# Patient Record
Sex: FEMALE | Race: OTHER
[De-identification: names, ages, dates, MRNs, and addresses within clinical notes are randomized per-mention and may not be internally consistent; named-entity substitution may affect disease eponyms.]

---

## 2018-06-25 ENCOUNTER — HOSPITAL ENCOUNTER (EMERGENCY)
Dept: HOSPITAL 31 - C.ER | Age: 28
Discharge: HOME | End: 2018-06-25
Payer: MEDICAID

## 2018-06-25 VITALS
DIASTOLIC BLOOD PRESSURE: 64 MMHG | TEMPERATURE: 98 F | RESPIRATION RATE: 16 BRPM | SYSTOLIC BLOOD PRESSURE: 99 MMHG | HEART RATE: 92 BPM | OXYGEN SATURATION: 100 %

## 2018-06-25 DIAGNOSIS — M72.2: Primary | ICD-10-CM

## 2018-06-25 NOTE — C.PDOC
History Of Present Illness


29 y/o female presents to ED with c/o right heel pain for 1 week. Patient 

reports taking Ibuprofen with no relief and denies trauma, leg pain, calf pain, 

numbness or any other complaints at this time. 


Time Seen by Provider: 06/25/18 19:51


Chief Complaint (Nursing): Lower Extremity Problem/Injury


History Per: Patient


History/Exam Limitations: no limitations


Onset/Duration Of Symptoms: Days


Current Symptoms Are (Timing): Still Present





Past Medical History


Reviewed: Historical Data, Nursing Documentation, Vital Signs


Vital Signs: 


 Last Vital Signs











Temp  98 F   06/25/18 19:41


 


Pulse  92 H  06/25/18 19:41


 


Resp  16   06/25/18 19:41


 


BP  99/64 L  06/25/18 19:41


 


Pulse Ox  100   06/25/18 21:10














- Medical History


PMH: No Chronic Diseases


Surgical History: No Surg Hx





- [facility] Procedures








ESOPHAGOGASTRODUODENOSCOPY [EGD] W/CLOSED BIOPSY (08/20/15)








Family History: States: No Known Family Hx





- Social History


Hx Alcohol Use: No


Hx Substance Use: No





- Immunization History


Hx Tetanus Toxoid Vaccination: No


Hx Influenza Vaccination: Yes


Hx Pneumococcal Vaccination: No





Review Of Systems


Constitutional: Negative for: Fever, Chills


Cardiovascular: Negative for: Chest Pain


Musculoskeletal: Positive for: Foot Pain


Skin: Negative for: Rash


Neurological: Negative for: Weakness, Numbness





Physical Exam





- Physical Exam


Appears: Non-toxic, No Acute Distress


Skin: Warm, Dry, No Rash


Head: Atraumatic, Normacephalic


Eye(s): bilateral: Normal Inspection


Oral Mucosa: Moist


Extremity: Tenderness (to right heel), No Calf Tenderness, Capillary Refill (<2 

seconds), No Swelling, Other (No erythema.)


Extremity: Bilateral: Normal ROM


Neurological/Psych: Oriented x3, Normal Motor, Normal Sensation


Gait: Steady





ED Course And Treatment


O2 Sat by Pulse Oximetry: 100 (RA)


Pulse Ox Interpretation: Normal


Progress Note: Pt advised Podiatry follow up





Disposition


Counseled Patient/Family Regarding: Diagnosis, Need For Followup, Rx Given





- Disposition


Referrals: 


John Casey MD [Medical Doctor] - 


Disposition: HOME/ ROUTINE


Disposition Time: 20:10


Condition: STABLE


Additional Instructions: 


Please follow up with Podiatry





Wear supportive shoes





Take motrin PO 





Return to ER if worse 


Instructions:  Heel Pain (Caused by Plantar Fasciitis) (DC)


Forms:  [facility] (English)





- Clinical Impression


Clinical Impression: 


 Plantar fasciitis of right foot








- PA / NP / Resident Statement


MD/DO has reviewed & agrees with the documentation as recorded.





- Scribe Statement


The provider has reviewed the documentation as recorded by the Alexibkell Manzo





All medical record entries made by the Pako were at my direction and 

personally dictated by me. I have reviewed the chart and agree that the record 

accurately reflects my personal performance of the history, physical exam, 

medical decision making, and the department course for this patient. I have 

also personally directed, reviewed, and agree with the discharge instructions 

and disposition.